# Patient Record
(demographics unavailable — no encounter records)

---

## 2024-11-04 NOTE — HISTORY OF PRESENT ILLNESS
[Gradual] : gradual [5] : 5 [0] : 0 [Dull/Aching] : dull/aching [Localized] : localized [Intermittent] : intermittent [Leisure] : leisure [Rest] : rest [Exercising] : exercising [Retired] : Work status: retired [Other:____] : [unfilled] [de-identified] : The patient has normal pain right knee with activities.  Improvement send Zilretta injection right knee in July.  She says she did not have that much improvement after last round of Triluron injections.  Doing home exercises [] : Post Surgical Visit: no [FreeTextEntry1] : R Knee  [de-identified] : 7/25/2024 [de-identified] : Dr. Israel  [de-identified] : 7/25/2024

## 2024-11-04 NOTE — DISCUSSION/SUMMARY
[de-identified] : Ice p.r.n. Continue Advil p.r.n. Continue home exercises I discussed possibly trying Gelsyn injections if symptoms worsen.  I gave her a pamphlet.  She will call me if she wants to arrange for this  Impression: Moderate osteoarthritis right knee

## 2024-11-04 NOTE — IMAGING
[de-identified] : Normal gait  Right knee No swelling Slight medial facet tenderness Passive range of motion 0 to 125 degrees Ligaments are stable Quad strength 5/5  Right leg No swelling Calf is soft and nontender

## 2025-03-27 NOTE — HISTORY OF PRESENT ILLNESS
[Gradual] : gradual [5] : 5 [0] : 0 [Dull/Aching] : dull/aching [Localized] : localized [Intermittent] : intermittent [Leisure] : leisure [Rest] : rest [Exercising] : exercising [Retired] : Work status: retired [1] : 1 [Other:____] : [unfilled] [de-identified] : The patient has increased pain right knee over the past month.  Mild pain standing and walking.  Pain with stairs and movie sign.  Doing home exercises.  Taking Advil prn.  She did have improvement after previous Zilretta injection.  She says Triluron really did not help her much [] : Post Surgical Visit: no [FreeTextEntry1] : R Knee  [de-identified] : 11/4/2024 [de-identified] : Dr. Israel  [de-identified] : 11/4/2024 [de-identified] : Rt knee

## 2025-03-27 NOTE — PROCEDURE
[Large Joint Injection] : Large joint injection [Right] : of the right [Knee] : knee [Pain] : pain [Alcohol] : alcohol [Betadine] : betadine [Ethyl Chloride sprayed topically] : ethyl chloride sprayed topically [Sterile technique used] : sterile technique used [#1] : series #1 [Patient was advised to rest the joint(s) for ____ days] : patient was advised to rest the joint(s) for [unfilled] days [Risks, benefits, alternatives discussed / Verbal consent obtained] : the risks benefits, and alternatives have been discussed, and verbal consent was obtained [Altered anatomic landmarks d/t erosive arthritis] : altered anatomic landmarks d/t erosive arthritis [All ultrasound images have been permanently captured and stored accordingly in our picture archiving and communication system] : All ultrasound images have been permanently captured and stored accordingly in our picture archiving and communication system [Gel-Syn (16.8mg)] : 16.8mg of Gel-Syn [FreeTextEntry3] : Do not submerge underwater for 24 hours

## 2025-03-27 NOTE — IMAGING
[de-identified] : Normal gait  Right knee No swelling Mild medial facet and joint line tenderness Passive range of motion 0 to 125 degrees Ligaments are stable Quad strength 5/5  Right leg No swelling Calf is soft and nontender Posterior tibial pulse 2+

## 2025-03-27 NOTE — DISCUSSION/SUMMARY
[de-identified] : Ice p.r.n. Continue Advil p.r.n. Continue home exercises I discussed trying Gelsyn injections and gave her a pamphlet. Risks including infection, swelling, stiffness, bleeding in addition to other associated risks with joint injection, benefits and alternatives were discussed with the patient She would like to do this  Impression: Moderate osteoarthritis right knee

## 2025-04-03 NOTE — PROCEDURE
[Large Joint Injection] : Large joint injection [Right] : of the right [Knee] : knee [Pain] : pain [Alcohol] : alcohol [Betadine] : betadine [Ethyl Chloride sprayed topically] : ethyl chloride sprayed topically [Sterile technique used] : sterile technique used [Gel-Syn (16.8mg)] : 16.8mg of Gel-Syn [Patient was advised to rest the joint(s) for ____ days] : patient was advised to rest the joint(s) for [unfilled] days [Risks, benefits, alternatives discussed / Verbal consent obtained] : the risks benefits, and alternatives have been discussed, and verbal consent was obtained [Altered anatomic landmarks d/t erosive arthritis] : altered anatomic landmarks d/t erosive arthritis [All ultrasound images have been permanently captured and stored accordingly in our picture archiving and communication system] : All ultrasound images have been permanently captured and stored accordingly in our picture archiving and communication system [#2] : series #2 [FreeTextEntry3] : Do not submerge underwater for 24 hours

## 2025-04-03 NOTE — DISCUSSION/SUMMARY
[de-identified] : Ice p.r.n. Continue Advil p.r.n. Continue home exercises   Impression: Moderate osteoarthritis right knee

## 2025-04-03 NOTE — IMAGING
[de-identified] : Normal gait  Right knee No swelling Mild medial facet and joint line tenderness Passive range of motion 0 to 125 degrees  Right leg No swelling Calf is soft and nontender

## 2025-04-03 NOTE — HISTORY OF PRESENT ILLNESS
[Gradual] : gradual [5] : 5 [0] : 0 [Dull/Aching] : dull/aching [Localized] : localized [Intermittent] : intermittent [Leisure] : leisure [Rest] : rest [Exercising] : exercising [Retired] : Work status: retired [2] : 2 [Gelsyn] : Gelsyn [de-identified] : The patient has continued pain right knee.  Mild pain standing and walking.  No change after the first Gelsyn injection [] : Post Surgical Visit: no [FreeTextEntry1] : R Knee  [de-identified] : 11/4/2024 [de-identified] : Dr. Israel  [de-identified] : 3/27/2024 [de-identified] : Rt knee

## 2025-04-10 NOTE — DISCUSSION/SUMMARY
[de-identified] : Ice p.r.n. Continue Advil p.r.n. Continue home exercises THE PATIENT HAS QUESTIONABLE ALLERGY TO LOBSTER.  OTHER SHELLFISH OKAY   Impression: Moderate osteoarthritis right knee

## 2025-04-10 NOTE — IMAGING
[de-identified] : Normal gait  Right knee No swelling Mild medial facet and joint line tenderness Passive range of motion 0 to 125 degrees  Right leg No swelling Calf is soft and nontender

## 2025-04-10 NOTE — HISTORY OF PRESENT ILLNESS
[Gradual] : gradual [5] : 5 [0] : 0 [Dull/Aching] : dull/aching [Localized] : localized [Intermittent] : intermittent [Leisure] : leisure [Rest] : rest [Exercising] : exercising [Retired] : Work status: retired [3] : 3 [Gelsyn] : Gelsyn [de-identified] : The patient feels better after the second Gelsyn injection right knee.  She has mild pain standing and walking [] : Post Surgical Visit: no [FreeTextEntry1] : R Knee  [de-identified] : 11/4/2024 [de-identified] : Dr. Israel  [de-identified] : 4/3/2025 [de-identified] : Rt knee

## 2025-04-10 NOTE — PROCEDURE
[Large Joint Injection] : Large joint injection [Right] : of the right [Knee] : knee [Pain] : pain [Alcohol] : alcohol [Ethyl Chloride sprayed topically] : ethyl chloride sprayed topically [Sterile technique used] : sterile technique used [Gel-Syn (16.8mg)] : 16.8mg of Gel-Syn [Patient was advised to rest the joint(s) for ____ days] : patient was advised to rest the joint(s) for [unfilled] days [Risks, benefits, alternatives discussed / Verbal consent obtained] : the risks benefits, and alternatives have been discussed, and verbal consent was obtained [Altered anatomic landmarks d/t erosive arthritis] : altered anatomic landmarks d/t erosive arthritis [All ultrasound images have been permanently captured and stored accordingly in our picture archiving and communication system] : All ultrasound images have been permanently captured and stored accordingly in our picture archiving and communication system [#3] : series #3 [de-identified] : prepped with Chlorhexidine [FreeTextEntry3] : Do not submerge underwater for 24 hours

## 2025-05-20 NOTE — PROCEDURE
[Large Joint Injection] : Large joint injection [Right] : of the right [Knee] : knee [Pain] : pain [Alcohol] : alcohol [Betadine] : betadine [Ethyl Chloride sprayed topically] : ethyl chloride sprayed topically [Sterile technique used] : sterile technique used [___ cc    32 units 5mg] : Zilretta ~Vcc of 32 units 5 mg  [Patient was advised to rest the joint(s) for ____ days] : patient was advised to rest the joint(s) for [unfilled] days [Risks, benefits, alternatives discussed / Verbal consent obtained] : the risks benefits, and alternatives have been discussed, and verbal consent was obtained [Altered anatomic landmarks d/t erosive arthritis] : altered anatomic landmarks d/t erosive arthritis [All ultrasound images have been permanently captured and stored accordingly in our picture archiving and communication system] : All ultrasound images have been permanently captured and stored accordingly in our picture archiving and communication system [FreeTextEntry3] : Do not submerge underwater for 48 hours

## 2025-05-20 NOTE — IMAGING
[Right] : right knee [de-identified] : Normal gait  Right knee No swelling Mild medial facet and joint line tenderness Passive range of motion 0 to 125 degrees  Right leg No swelling Calf is soft and nontender  [FreeTextEntry9] : Reviewed and interpreted.  Right knee AP standing, lateral, sunrise views-moderate to severe degenerative changes with narrowing of the medial compartment on AP standing view, spurring patellofemoral joint

## 2025-05-20 NOTE — DISCUSSION/SUMMARY
[de-identified] : Ice p.r.n. Tylenol prn Continue Advil with food p.r.n. Risk benefits and alternatives of prescribed medication(s) were discussed with the patient. Side effects were discussed including risks of GI irritation and bleeding. Questions were answered. Discontinue medication if any issues. To call me if any questions or concerns Continue home exercises I discussed repeat Zilretta injection. Risks including infection, swelling, stiffness, bleeding in addition to other associated risks with joint injection, benefits and alternatives were discussed with the patient She would like to do this She has mild diabetes and does not need to check her sugar on a regular basis THE PATIENT HAS QUESTIONABLE ALLERGY TO LOBSTER. OTHER SHELLFISH OKAY-NO PROBLEM WITH BETADINE  Impression: Moderate osteoarthritis right knee

## 2025-05-20 NOTE — HISTORY OF PRESENT ILLNESS
[Gradual] : gradual [5] : 5 [0] : 0 [Dull/Aching] : dull/aching [Localized] : localized [Intermittent] : intermittent [Leisure] : leisure [Rest] : rest [Exercising] : exercising [Retired] : Work status: retired [de-identified] : The patient has had mild increased pain right knee over the past 2 weeks.  Mild pain standing and walking.  Mild pain with stairs and movie sign.  Doing home exercises.  She did have some mprovement after previous Gelsyn injections [] : Post Surgical Visit: no [FreeTextEntry1] : R Knee  [de-identified] : 4/10/2025 [de-identified] : Dr. Israel  [de-identified] : 4/10/2025